# Patient Record
Sex: MALE | Race: WHITE | NOT HISPANIC OR LATINO | URBAN - METROPOLITAN AREA
[De-identification: names, ages, dates, MRNs, and addresses within clinical notes are randomized per-mention and may not be internally consistent; named-entity substitution may affect disease eponyms.]

---

## 2021-05-10 ENCOUNTER — IMMUNIZATION (OUTPATIENT)
Dept: FAMILY MEDICINE | Facility: CLINIC | Age: 21
End: 2021-05-10
Payer: COMMERCIAL

## 2021-05-10 ENCOUNTER — HOSPITAL ENCOUNTER (OUTPATIENT)
Dept: BEHAVIORAL HEALTH | Facility: CLINIC | Age: 21
End: 2021-05-10
Attending: FAMILY MEDICINE
Payer: COMMERCIAL

## 2021-05-10 PROCEDURE — 91301 PR COVID VAC MODERNA 100 MCG/0.5 ML IM: CPT

## 2021-05-10 PROCEDURE — 0011A PR COVID VAC MODERNA 100 MCG/0.5 ML IM: CPT

## 2021-05-10 PROCEDURE — 999N000216 HC STATISTIC ADULT CD FACE TO FACE-NO CHRG: Mod: GT | Performed by: SOCIAL WORKER

## 2021-05-10 NOTE — PROGRESS NOTES
[facility]    ADULT Mental Health Assessment Appointment Note    PATIENT'S NAME:  Leonardo Choi    Preferred Pronoun: Leonardo  MRN: [id number]  YOB: 2000  Address:  [address] [address]  PREFERRED PHONE: [phone]  May we leave a referral related message: Yes    DATE OF SERVICE: 5/10/21  START TIME: 1:30PM  END TIME: 2:28PM  SERVICE MODALITY:  Video Visit:      Provider verified identity through the following two step process.  Patient provided:  Patient     Telemedicine Visit: The patient's condition can be safely assessed and treated via synchronous audio and visual telemedicine encounter.      Reason for Telemedicine Visit: Patient has requested telehealth visit    Originating Site (Patient Location): Patient's home    Distant Site (Provider Location): Provider Remote Setting    Consent:  The patient/guardian has verbally consented to: the potential risks and benefits of telemedicine (video visit) versus in person care; bill my insurance or make self-payment for services provided; and responsibility for payment of non-covered services.     Patient would like the video invitation sent by:  Send to e-mail at: [email or web address]    Mode of Communication:  Video Conference via Amwell    As the provider I attest to compliance with applicable laws and regulations related to telemedicine.    Identifying Information:  Patient is a 21 year old, .    Patient was referred for an assessment by self.    Patient attended the session alone.   Patient identified their preferred language to be English.   Patient reported they does not need the assistance of an  or other support involved in therapy.     Services Requested:   The reason for seeking services at this time is: feels he has been depressed for the past year is a senior at [employer].   -anxious in social situations and that causes him to avoid social  situations.  -denied suicidal thinking.  -lacks support.   -school ended and he is back in [place].   -        Patient has note attempted to resolve these concerns in the past.  Patient does not have legal concerns.    Mental Health:  Review of Symptoms per patient report:  Depression: Change in sleep, Lack of interest, Change in energy level, Difficulties concentrating, Feelings of hopelessness, Feelings of helplessness, Irritability, Feeling sad, down, or depressed and Withdrawn  Staci: No Symptoms  Psychosis: No Symptoms  Anxiety: Excessive worry, Nervousness, Physical complaints, such as headaches, stomachaches, muscle tension, Social anxiety, Sleep disturbance, Psychomotor agitation, Ruminations, Poor concentration and Irritability  Panic: No symptoms  Post Traumatic Stress Disorder: No Symptoms  Eating Disorder: No Symptoms  ADD / ADHD: No symptoms  Conduct Disorder: No symptoms  Autism Spectrum Disorder: No symptoms  Obsessive Compulsive Disorder: No Symptoms    Substance Use:  Do you  have a history of alcohol or illicit drug use? Very occasional use.       Significant Losses / Trauma / Abuse / Neglect Issues:   Patient did not serve in the .  There are indications or report of significant loss, trauma, abuse or neglect issues related to:  None identified.  Concerns for possible neglect are not present.     Medical History:  Patient reports no current medical concerns.    Patient denies any issues with pain..   There are not significant appetite / nutritional concerns / weight changes.     Patient does not report a history of head injury / trauma / cognitive impairment.      Current Mental Status Exam:   Appearance:  Appropriate    Eye Contact:  Fair   Psychomotor:  Normal   Attitude / Demeanor: Guarded   Speech Rate:  Monotone   Volume:  Normal  volume  Language:  intact  Mood:   Anxious  Depressed   Affect:   Constricted    Thought Content: Clear   Thought Process: Coherent     Associations:  No  loosening of associations  Insight:   Fair   Judgment:  Intact   Orientation:  All  Attention:  Fair    Safety Assessment:  Client denied any suicidal ideation or intent to harm self or others. He denied any past suicide attempts and denied any imminent plans to harm himself.  He is living with his parents and help seeking.  Current Safety Concerns:  [place] Suicide Severity Rating Scale (Short Version)  [place] Suicide Severity Rating (Short Version) 5/10/2021   Over the past 2 weeks have you felt down, depressed, or hopeless? yes   Over the past 2 weeks have you had thoughts of killing yourself? no   Have you ever attempted to kill yourself? no     Patient denies current homicidal ideation and behaviors.  Patient denies current self-injurious ideation and behaviors.    Patient denied risk behaviors associated with substance use.  Patient denies any high risk behaviors associated with mental health symptoms.  Patient reports the following current concerns for their personal safety: None.  Patient reports there are  firearms in the house. The firearms are secured in a locked space.     Clinical Impressions:  A. Excessive anxiety and worry about a number of events or activities (such as work or school performance).   B. The person finds it difficult to control the worry.  C. Select 3 or more symptoms (required for diagnosis). Only one item is required in children.   - Being easily fatigued.    - Difficulty concentrating or mind going blank.    - Irritability.    - Muscle tension.    - Sleep disturbance (difficulty falling or staying asleep, or restless unsatisfying sleep).   D. The focus of the anxiety and worry is not confined to features of an Axis I disorder.  E. The anxiety, worry, or physical symptoms cause clinically significant distress or impairment in social, occupational, or other important areas of functioning.   F. The disturbance is not due to the direct physiological effects of a substance (e.g., a  drug of abuse, a medication) or a general medical condition (e.g., hyperthyroidism) and does not occur exclusively during a Mood Disorder, a Psychotic Disorder, or a Pervasive Developmental Disorder.  A) Single episode - symptoms have been present during the same 2-week period and represent a change from previous functioning 5 or more symptoms (required for diagnosis)   - Depressed mood. Note: In children and adolescents, can be irritable mood.     - Diminished interest or pleasure in all, or almost all, activities.    - Psychomotor activity agitation.    - Fatigue or loss of energy.    - Diminished ability to think or concentrate, or indecisiveness.   B) The symptoms cause clinically significant distress or impairment in social, occupational, or other important areas of functioning  C) The episode is not attributable to the physiological effects of a substance or to another medical condition  D) The occurence of major depressive episode is not better explained by other thought / psychotic disorders  E) There has never been a manic episode or hypomanic episode    Therapeutic Interventions Provided: supportive active listening and emotional validation    Recommendations/Plan:  Client returned home from [facility]. He reported the past year he has become increasingly depressed and withdrawn with social anxiety. He has been having very low motivation and has not been able to focus or attend to class assignments and thus has failed his classes this semester and must take them over next fall. He reported limited social support. He denied any suicidal ideation or intent to harm self. He is living with his parents in [place] and is help seeking. We discussed both IOP and the PHP program as well as a referral for medication assessment. At this time he would like to start with individual therapy. Writer assisted him to find a therapy appointment through [facility].     Referrals:   Date: Tuesday,  5/11/2021  Time: 2:00 pm - 3:00 pm  Provider: Lamin Cheung MA  Supervised by: Vanda Yusuf MA  LMFT  Location: [facility], [facility] [employer], [address], [place] [address]  Phone: [phone]  Type: Teletherapy  Lamin Cheung MA  [facility], [facility]  [address] [phone] 5/11/2021 2:00 pm Scheduled      Zuri Chirinos [facility],  May 10, 2021  [facility]

## 2021-05-16 ENCOUNTER — HEALTH MAINTENANCE LETTER (OUTPATIENT)
Age: 21
End: 2021-05-16

## 2021-06-07 ENCOUNTER — IMMUNIZATION (OUTPATIENT)
Dept: FAMILY MEDICINE | Facility: CLINIC | Age: 21
End: 2021-06-07
Attending: FAMILY MEDICINE
Payer: COMMERCIAL

## 2021-06-07 PROCEDURE — 0012A PR COVID VAC MODERNA 100 MCG/0.5 ML IM: CPT

## 2021-06-07 PROCEDURE — 91301 PR COVID VAC MODERNA 100 MCG/0.5 ML IM: CPT

## 2021-09-05 ENCOUNTER — HEALTH MAINTENANCE LETTER (OUTPATIENT)
Age: 21
End: 2021-09-05

## 2021-09-22 ENCOUNTER — LAB REQUISITION (OUTPATIENT)
Dept: LAB | Facility: CLINIC | Age: 21
End: 2021-09-22
Payer: COMMERCIAL

## 2021-09-22 DIAGNOSIS — Z03.818 ENCOUNTER FOR OBSERVATION FOR SUSPECTED EXPOSURE TO OTHER BIOLOGICAL AGENTS RULED OUT: ICD-10-CM

## 2021-10-05 ENCOUNTER — LAB REQUISITION (OUTPATIENT)
Dept: LAB | Facility: CLINIC | Age: 21
End: 2021-10-05
Payer: COMMERCIAL

## 2021-10-05 DIAGNOSIS — Z03.818 ENCOUNTER FOR OBSERVATION FOR SUSPECTED EXPOSURE TO OTHER BIOLOGICAL AGENTS RULED OUT: ICD-10-CM

## 2021-10-08 ENCOUNTER — LAB REQUISITION (OUTPATIENT)
Dept: LAB | Facility: CLINIC | Age: 21
End: 2021-10-08
Payer: COMMERCIAL

## 2021-10-08 DIAGNOSIS — Z03.818 ENCOUNTER FOR OBSERVATION FOR SUSPECTED EXPOSURE TO OTHER BIOLOGICAL AGENTS RULED OUT: ICD-10-CM

## 2021-11-22 ENCOUNTER — LAB REQUISITION (OUTPATIENT)
Dept: LAB | Facility: CLINIC | Age: 21
End: 2021-11-22
Payer: COMMERCIAL

## 2021-11-22 DIAGNOSIS — Z03.818 ENCOUNTER FOR OBSERVATION FOR SUSPECTED EXPOSURE TO OTHER BIOLOGICAL AGENTS RULED OUT: ICD-10-CM

## 2021-11-29 ENCOUNTER — LAB REQUISITION (OUTPATIENT)
Dept: LAB | Facility: CLINIC | Age: 21
End: 2021-11-29
Payer: COMMERCIAL

## 2021-11-29 DIAGNOSIS — Z03.818 ENCOUNTER FOR OBSERVATION FOR SUSPECTED EXPOSURE TO OTHER BIOLOGICAL AGENTS RULED OUT: ICD-10-CM

## 2022-06-12 ENCOUNTER — HEALTH MAINTENANCE LETTER (OUTPATIENT)
Age: 22
End: 2022-06-12

## 2022-07-07 ENCOUNTER — LAB REQUISITION (OUTPATIENT)
Dept: LAB | Facility: CLINIC | Age: 22
End: 2022-07-07
Payer: COMMERCIAL

## 2022-07-07 DIAGNOSIS — Z03.818 ENCOUNTER FOR OBSERVATION FOR SUSPECTED EXPOSURE TO OTHER BIOLOGICAL AGENTS RULED OUT: ICD-10-CM

## 2022-07-07 PROCEDURE — U0005 INFEC AGEN DETEC AMPLI PROBE: HCPCS | Mod: ORL | Performed by: FAMILY MEDICINE

## 2022-07-08 LAB — SARS-COV-2 RNA RESP QL NAA+PROBE: NEGATIVE

## 2022-07-13 ENCOUNTER — LAB REQUISITION (OUTPATIENT)
Dept: LAB | Facility: CLINIC | Age: 22
End: 2022-07-13
Payer: COMMERCIAL

## 2022-07-13 DIAGNOSIS — Z03.818 ENCOUNTER FOR OBSERVATION FOR SUSPECTED EXPOSURE TO OTHER BIOLOGICAL AGENTS RULED OUT: ICD-10-CM

## 2022-07-13 PROCEDURE — U0003 INFECTIOUS AGENT DETECTION BY NUCLEIC ACID (DNA OR RNA); SEVERE ACUTE RESPIRATORY SYNDROME CORONAVIRUS 2 (SARS-COV-2) (CORONAVIRUS DISEASE [COVID-19]), AMPLIFIED PROBE TECHNIQUE, MAKING USE OF HIGH THROUGHPUT TECHNOLOGIES AS DESCRIBED BY CMS-2020-01-R: HCPCS | Mod: ORL | Performed by: FAMILY MEDICINE

## 2022-07-14 LAB — SARS-COV-2 RNA RESP QL NAA+PROBE: NEGATIVE

## 2022-07-27 PROCEDURE — U0003 INFECTIOUS AGENT DETECTION BY NUCLEIC ACID (DNA OR RNA); SEVERE ACUTE RESPIRATORY SYNDROME CORONAVIRUS 2 (SARS-COV-2) (CORONAVIRUS DISEASE [COVID-19]), AMPLIFIED PROBE TECHNIQUE, MAKING USE OF HIGH THROUGHPUT TECHNOLOGIES AS DESCRIBED BY CMS-2020-01-R: HCPCS | Mod: ORL | Performed by: FAMILY MEDICINE

## 2022-07-28 ENCOUNTER — LAB REQUISITION (OUTPATIENT)
Dept: LAB | Facility: CLINIC | Age: 22
End: 2022-07-28
Payer: COMMERCIAL

## 2022-07-28 DIAGNOSIS — U07.1 COVID-19: ICD-10-CM

## 2022-07-28 LAB — SARS-COV-2 RNA RESP QL NAA+PROBE: NEGATIVE

## 2022-08-01 ENCOUNTER — LAB REQUISITION (OUTPATIENT)
Dept: LAB | Facility: CLINIC | Age: 22
End: 2022-08-01
Payer: COMMERCIAL

## 2022-08-01 DIAGNOSIS — U07.1 COVID-19: ICD-10-CM

## 2022-08-01 DIAGNOSIS — Z03.818 ENCOUNTER FOR OBSERVATION FOR SUSPECTED EXPOSURE TO OTHER BIOLOGICAL AGENTS RULED OUT: ICD-10-CM

## 2022-08-01 PROCEDURE — U0003 INFECTIOUS AGENT DETECTION BY NUCLEIC ACID (DNA OR RNA); SEVERE ACUTE RESPIRATORY SYNDROME CORONAVIRUS 2 (SARS-COV-2) (CORONAVIRUS DISEASE [COVID-19]), AMPLIFIED PROBE TECHNIQUE, MAKING USE OF HIGH THROUGHPUT TECHNOLOGIES AS DESCRIBED BY CMS-2020-01-R: HCPCS | Mod: ORL | Performed by: FAMILY MEDICINE

## 2022-08-02 LAB — SARS-COV-2 RNA RESP QL NAA+PROBE: NEGATIVE

## 2022-08-04 PROCEDURE — U0003 INFECTIOUS AGENT DETECTION BY NUCLEIC ACID (DNA OR RNA); SEVERE ACUTE RESPIRATORY SYNDROME CORONAVIRUS 2 (SARS-COV-2) (CORONAVIRUS DISEASE [COVID-19]), AMPLIFIED PROBE TECHNIQUE, MAKING USE OF HIGH THROUGHPUT TECHNOLOGIES AS DESCRIBED BY CMS-2020-01-R: HCPCS | Mod: ORL | Performed by: FAMILY MEDICINE

## 2022-08-05 ENCOUNTER — LAB REQUISITION (OUTPATIENT)
Dept: LAB | Facility: CLINIC | Age: 22
End: 2022-08-05
Payer: COMMERCIAL

## 2022-08-05 DIAGNOSIS — Z03.818 ENCOUNTER FOR OBSERVATION FOR SUSPECTED EXPOSURE TO OTHER BIOLOGICAL AGENTS RULED OUT: ICD-10-CM

## 2022-08-05 LAB — SARS-COV-2 RNA RESP QL NAA+PROBE: NEGATIVE

## 2022-08-10 ENCOUNTER — LAB REQUISITION (OUTPATIENT)
Dept: LAB | Facility: CLINIC | Age: 22
End: 2022-08-10
Payer: COMMERCIAL

## 2022-08-10 DIAGNOSIS — Z03.818 ENCOUNTER FOR OBSERVATION FOR SUSPECTED EXPOSURE TO OTHER BIOLOGICAL AGENTS RULED OUT: ICD-10-CM

## 2022-12-15 ENCOUNTER — OFFICE VISIT (OUTPATIENT)
Dept: FAMILY MEDICINE | Facility: CLINIC | Age: 22
End: 2022-12-15
Payer: COMMERCIAL

## 2022-12-15 VITALS
HEIGHT: 62 IN | TEMPERATURE: 98.6 F | OXYGEN SATURATION: 97 % | DIASTOLIC BLOOD PRESSURE: 78 MMHG | HEART RATE: 105 BPM | SYSTOLIC BLOOD PRESSURE: 148 MMHG | BODY MASS INDEX: 39.14 KG/M2 | WEIGHT: 212.7 LBS | RESPIRATION RATE: 14 BRPM

## 2022-12-15 DIAGNOSIS — Z11.59 NEED FOR HEPATITIS C SCREENING TEST: ICD-10-CM

## 2022-12-15 DIAGNOSIS — Z23 NEED FOR VACCINATION: Primary | ICD-10-CM

## 2022-12-15 DIAGNOSIS — Z11.4 SCREENING FOR HIV (HUMAN IMMUNODEFICIENCY VIRUS): ICD-10-CM

## 2022-12-15 PROCEDURE — 90651 9VHPV VACCINE 2/3 DOSE IM: CPT | Performed by: STUDENT IN AN ORGANIZED HEALTH CARE EDUCATION/TRAINING PROGRAM

## 2022-12-15 PROCEDURE — 36415 COLL VENOUS BLD VENIPUNCTURE: CPT | Performed by: STUDENT IN AN ORGANIZED HEALTH CARE EDUCATION/TRAINING PROGRAM

## 2022-12-15 PROCEDURE — 90472 IMMUNIZATION ADMIN EACH ADD: CPT | Performed by: STUDENT IN AN ORGANIZED HEALTH CARE EDUCATION/TRAINING PROGRAM

## 2022-12-15 PROCEDURE — 90714 TD VACC NO PRESV 7 YRS+ IM: CPT | Performed by: STUDENT IN AN ORGANIZED HEALTH CARE EDUCATION/TRAINING PROGRAM

## 2022-12-15 PROCEDURE — 87389 HIV-1 AG W/HIV-1&-2 AB AG IA: CPT | Performed by: STUDENT IN AN ORGANIZED HEALTH CARE EDUCATION/TRAINING PROGRAM

## 2022-12-15 PROCEDURE — 90471 IMMUNIZATION ADMIN: CPT | Performed by: STUDENT IN AN ORGANIZED HEALTH CARE EDUCATION/TRAINING PROGRAM

## 2022-12-15 PROCEDURE — 99385 PREV VISIT NEW AGE 18-39: CPT | Mod: 25 | Performed by: STUDENT IN AN ORGANIZED HEALTH CARE EDUCATION/TRAINING PROGRAM

## 2022-12-15 PROCEDURE — 86803 HEPATITIS C AB TEST: CPT | Performed by: STUDENT IN AN ORGANIZED HEALTH CARE EDUCATION/TRAINING PROGRAM

## 2022-12-15 RX ORDER — ESTRADIOL 2 MG/1
4 TABLET ORAL DAILY
COMMUNITY
End: 2023-10-13

## 2022-12-15 RX ORDER — LORATADINE 10 MG/1
10 TABLET ORAL DAILY
COMMUNITY
End: 2023-10-13

## 2022-12-15 RX ORDER — SPIRONOLACTONE 100 MG/1
100 TABLET, FILM COATED ORAL DAILY
COMMUNITY
End: 2023-10-13

## 2022-12-15 ASSESSMENT — ENCOUNTER SYMPTOMS
MYALGIAS: 0
FEVER: 0
HEARTBURN: 0
HEADACHES: 0
COUGH: 0
DIZZINESS: 0
SORE THROAT: 0
JOINT SWELLING: 0
FREQUENCY: 0
CONSTIPATION: 0
SHORTNESS OF BREATH: 0
DYSURIA: 0
CHILLS: 0
HEMATURIA: 0
NAUSEA: 0
ABDOMINAL PAIN: 0
WEAKNESS: 0
DIARRHEA: 0
HEMATOCHEZIA: 0
PALPITATIONS: 0
NERVOUS/ANXIOUS: 1
EYE PAIN: 0
ARTHRALGIAS: 0
PARESTHESIAS: 0

## 2022-12-15 ASSESSMENT — PAIN SCALES - GENERAL: PAINLEVEL: NO PAIN (0)

## 2022-12-15 NOTE — LETTER
December 16, 2022      Leonardo Choi  [address] [address] [address]        Dear ,    We are writing to inform you of your test results.    It was a pleasure speaking with you. I have received and reviewed your lab results, and have the following recommendations:      Congrats! You tested negative for HIV and Hep C.      Resulted Orders   Hepatitis C Screen Reflex to HCV RNA Quant and Genotype   Result Value Ref Range    Hepatitis C Antibody Nonreactive Nonreactive    Narrative    Assay performance characteristics have not been established for newborns, infants, and children.   HIV Antigen Antibody Combo   Result Value Ref Range    HIV Antigen Antibody Combo Nonreactive Nonreactive      Comment:      HIV-1 p24 Ag & HIV-1/HIV-2 Ab Not Detected       If you have any questions or concerns, please call the clinic at the number listed above.       Sincerely,      Cee Kruse MD

## 2022-12-15 NOTE — NURSING NOTE
Immunizations Administered     Name Date Dose VIS Date Route    HPV9 12/15/22  1:29 PM 0.5 mL 08/06/2021, Given Today Intramuscular    TD (ADULT, 7+) 12/15/22  1:29 PM 0.5 mL 08/06/2021, Given Today Intramuscular        .After obtaining informed consent, the HPV an TD immunization is given, one in each deltoid. Patient verified all immunization questions prior to vaccination. Patient is remaining in clinic for 15 minutes to monitor for adverse reactions.    MEMO Owens LPN on 12/15/2022 at 1:33 PM

## 2022-12-15 NOTE — PATIENT INSTRUCTIONS
Hello! It was a pleasure seeing you today. Just some things we discussed at today's visit:     Weight loss    Try to limit your dietary intake of carbohydrate rich foods like rice, pasta, breads, potatoes, sweets. Additionally, limit your intake of drinks with added sugars, such as juice, regular soda, and regular sports or energy drinks.  It is ok for you to have lean meats, chicken, turkey, fish, eggs, nuts, beans, lentils, and tofu.   With regards to exercise, try to get at least 30 minutes of CONTINUOUS aerobic exercise at least 3 times per week.     GAHT   You are always welcome in my clinic for GAHT, however, if you feel more comfortable going to [facility], then please make sure you follow up for routine lab work       Sincerely,     Cee Kruse MD

## 2022-12-15 NOTE — PROGRESS NOTES
"SUBJECTIVE:   CC: Leonardo is an 22 year old who presents for preventative health visit.     Patient has been advised of split billing requirements and indicates understanding: Yes  Healthy Habits:     Getting at least 3 servings of Calcium per day:  Yes    Bi-annual eye exam:  Yes    Dental care twice a year:  Yes    Sleep apnea or symptoms of sleep apnea:  None    Diet:  Regular (no restrictions)    Frequency of exercise:  2-3 days/week    Duration of exercise:  15-30 minutes    Taking medications regularly:  Yes    Barriers to taking medications:  None    Medication side effects:  None    PHQ-2 Total Score: 0    Additional concerns today:  No    Loratadine PRN for allergies    Has been on GAHT for 4.5 months when asked about pronouns responded \"anything is fine\"  Estrace and Spironolactone   Admits dysphoria has been \"not that great\"    Patient appeared visibly uncomfortable speaking with provider about GAHT, provider asked patient if they would be ok discussing the matter further, or if the patient would like to discuss their GAHT with their [facility] provider, patient preferred to speak about GAHT with their provider at [facility]     Social History:   Lives at home with parents in [place]   Got degree in chemical engineering, currently looking for employment   Denies any cigarette smoking admits they vaped 3 years ago but infrequently   Drinks only in social situations  Denies recreational drug use   Likes to read for fun likes mysteries       Family History:   Paternal grandmother with lung cancer had a smoking history but quit for at least 20 years     Allergies:   Was told they had an allergic reaction to amoxicillin as a child, but unsure what the reaction was, documented \"blood in stool\"     Surgeries:   None     Sexual History:   Never been sexually active     Today's PHQ-2 Score:   PHQ-2 ( 2000 Pfizer) 12/15/2022   Q1: Little interest or pleasure in doing things 0   Q2: Feeling down, " depressed or hopeless 0   PHQ-2 Score 0   Q1: Little interest or pleasure in doing things Not at all   Q2: Feeling down, depressed or hopeless Not at all   PHQ-2 Score 0       Have you ever done Advance Care Planning? (For example, a Health Directive, POLST, or a discussion with a medical provider or your loved ones about your wishes): No, advance care planning information given to patient to review.  Advanced care planning was discussed at today's visit.    Social History     Tobacco Use     Smoking status: Never     Smokeless tobacco: Not on file     Tobacco comments:     No exposure.   Substance Use Topics     Alcohol use: No     If you drink alcohol do you typically have >3 drinks per day or >7 drinks per week? No    Alcohol Use 12/15/2022   Prescreen: >3 drinks/day or >7 drinks/week? No   Prescreen: >3 drinks/day or >7 drinks/week? -       Last PSA: No results found for: PSA    Reviewed orders with patient. Reviewed health maintenance and updated orders accordingly - Yes    Reviewed and updated as needed this visit by clinical staff   Tobacco  Allergies  Meds              Reviewed and updated as needed this visit by Provider                 Review of Systems   Constitutional: Negative for chills and fever.   HENT: Negative for congestion, ear pain, hearing loss and sore throat.    Eyes: Negative for pain and visual disturbance.   Respiratory: Negative for cough and shortness of breath.    Cardiovascular: Negative for chest pain, palpitations and peripheral edema.   Gastrointestinal: Negative for abdominal pain, constipation, diarrhea, heartburn, hematochezia and nausea.   Genitourinary: Positive for impotence. Negative for dysuria, frequency, genital sores, hematuria, penile discharge and urgency.   Musculoskeletal: Negative for arthralgias, joint swelling and myalgias.   Skin: Negative for rash.   Neurological: Negative for dizziness, weakness, headaches and paresthesias.   Psychiatric/Behavioral: Negative  "for mood changes. The patient is nervous/anxious.      OBJECTIVE:   BP (!) 148/78 (BP Location: Right arm, Patient Position: Sitting, Cuff Size: Adult Regular)   Pulse 105   Temp 98.6  F (37  C) (Tympanic)   Resp 14   Ht 1.57 m (5' 1.81\")   Wt 96.5 kg (212 lb 11.2 oz)   SpO2 97%   BMI 39.14 kg/m      Physical Exam  Constitutional:       General: He is not in acute distress.  HENT:      Head: Normocephalic and atraumatic.      Right Ear: External ear normal.      Left Ear: External ear normal.      Mouth/Throat:      Mouth: Mucous membranes are moist.      Pharynx: Oropharynx is clear. No oropharyngeal exudate or posterior oropharyngeal erythema.   Eyes:      Extraocular Movements: Extraocular movements intact.   Cardiovascular:      Rate and Rhythm: Normal rate and regular rhythm.      Heart sounds: Normal heart sounds.   Pulmonary:      Effort: Pulmonary effort is normal. No respiratory distress.      Breath sounds: Normal breath sounds. No wheezing or rhonchi.   Abdominal:      Palpations: Abdomen is soft. There is no mass.      Tenderness: There is no abdominal tenderness.   Musculoskeletal:         General: No deformity. Normal range of motion.      Cervical back: Normal range of motion and neck supple.   Skin:     General: Skin is warm.      Findings: No rash.   Neurological:      General: No focal deficit present.      Mental Status: He is alert and oriented to person, place, and time.   Psychiatric:      Comments: Would not look provider in the eye, slow to answer questions, anxious appearing        ASSESSMENT/PLAN:   1. Screening for HIV (human immunodeficiency virus)  - HIV Antigen Antibody Combo    2. Need for hepatitis C screening test  - Hepatitis C Screen Reflex to HCV RNA Quant and Genotype    3. Need for vaccination  - the following medications were administered in clinic today   - Human Papilloma Virus Vaccine (Gardasil 9) 3 Dose IM  - TD (ADULT, 7+) PRESERVE FREE    Cee Kruse MD "     Patient has been advised of split billing requirements and indicates understanding: Yes      COUNSELING:   Reviewed preventive health counseling, as reflected in patient instructions       Regular exercise       Healthy diet/nutrition        He reports that he has never smoked. He does not have any smokeless tobacco history on file.            MARCIN CULP MD  [facility]

## 2022-12-16 LAB
HCV AB SERPL QL IA: NONREACTIVE
HIV 1+2 AB+HIV1 P24 AG SERPL QL IA: NONREACTIVE

## 2022-12-16 NOTE — RESULT ENCOUNTER NOTE
Dennise Choi,     It was a pleasure speaking with you. I have received and reviewed your lab results, and have the following recommendations:     Congrats! You tested negative for HIV and Hep C.        Sincerely,     Cee Kruse MD

## 2023-07-21 ENCOUNTER — OFFICE VISIT (OUTPATIENT)
Dept: FAMILY MEDICINE | Facility: CLINIC | Age: 23
End: 2023-07-21
Payer: COMMERCIAL

## 2023-07-21 VITALS
TEMPERATURE: 98.1 F | HEIGHT: 65 IN | HEART RATE: 68 BPM | WEIGHT: 206.8 LBS | RESPIRATION RATE: 18 BRPM | DIASTOLIC BLOOD PRESSURE: 92 MMHG | OXYGEN SATURATION: 98 % | SYSTOLIC BLOOD PRESSURE: 122 MMHG | BODY MASS INDEX: 34.45 KG/M2

## 2023-07-21 DIAGNOSIS — F64.0 GENDER DYSPHORIA IN ADULT: Primary | ICD-10-CM

## 2023-07-21 LAB
ANION GAP SERPL CALCULATED.3IONS-SCNC: 9 MMOL/L (ref 7–15)
BUN SERPL-MCNC: 8.3 MG/DL (ref 6–20)
CALCIUM SERPL-MCNC: 9.7 MG/DL (ref 8.6–10)
CHLORIDE SERPL-SCNC: 105 MMOL/L (ref 98–107)
CREAT SERPL-MCNC: 0.8 MG/DL (ref 0.67–1.17)
DEPRECATED HCO3 PLAS-SCNC: 27 MMOL/L (ref 22–29)
ESTRADIOL SERPL-MCNC: 422 PG/ML
GFR SERPL CREATININE-BSD FRML MDRD: >90 ML/MIN/1.73M2
GLUCOSE SERPL-MCNC: 98 MG/DL (ref 70–99)
POTASSIUM SERPL-SCNC: 3.9 MMOL/L (ref 3.4–5.3)
SODIUM SERPL-SCNC: 141 MMOL/L (ref 136–145)

## 2023-07-21 PROCEDURE — 82670 ASSAY OF TOTAL ESTRADIOL: CPT | Performed by: STUDENT IN AN ORGANIZED HEALTH CARE EDUCATION/TRAINING PROGRAM

## 2023-07-21 PROCEDURE — 99213 OFFICE O/P EST LOW 20 MIN: CPT | Performed by: STUDENT IN AN ORGANIZED HEALTH CARE EDUCATION/TRAINING PROGRAM

## 2023-07-21 PROCEDURE — 36415 COLL VENOUS BLD VENIPUNCTURE: CPT | Performed by: STUDENT IN AN ORGANIZED HEALTH CARE EDUCATION/TRAINING PROGRAM

## 2023-07-21 PROCEDURE — 84403 ASSAY OF TOTAL TESTOSTERONE: CPT | Performed by: STUDENT IN AN ORGANIZED HEALTH CARE EDUCATION/TRAINING PROGRAM

## 2023-07-21 PROCEDURE — 80048 BASIC METABOLIC PNL TOTAL CA: CPT | Performed by: STUDENT IN AN ORGANIZED HEALTH CARE EDUCATION/TRAINING PROGRAM

## 2023-07-21 ASSESSMENT — PAIN SCALES - GENERAL: PAINLEVEL: NO PAIN (0)

## 2023-07-21 NOTE — PROGRESS NOTES
"  1. Gender dysphoria in adult  > Patient has been on estrogen for roughly 1 year now, stop taking Aldactone 3 months ago due to increased urinary frequency  > Estradiol levels from 3 months ago was reportedly 177, and Testosterone was 14  - Estradiol  - Testosterone, total  - Basic metabolic panel  (Ca, Cl, CO2, Creat, Gluc, K, Na, BUN)  - Encouraged use of psychology today to find a therapist given that they are not out to friends or family members  - Reported that they would be interested in starting GnRH therapy, however, I did inform patient that the benefit of this medication in a patient who has already gone through puberty may not necessarily be of much use, recommended that we could try finasteride instead, patient reported that like to think about it first    Follow up plan:   - have patient return to clinic in 3 months pending lab results     Ravin Patterson is a 23 year old, presenting for the following health issues:  transgender care        7/21/2023     8:27 AM   Additional Questions   Roomed by Carina HUNTER LPN   Accompanied by self         7/21/2023     8:27 AM   Patient Reported Additional Medications   Patient reports taking the following new medications Fluticasone PRN     HPI   Chief Complaint   Patient presents with    transgender care     And lab work     Has been experiencing dysphoria since they were 13 years old   Has been on Estrogen for about 1 year now, started aldactone 6 months or so ago, stopped taking it 3 months ago and now wants to see if they don't have to take it   Never gone to therapy, admits they are not out to friends or family   Hasn't expressed gender identity publicly   Never been sexually active   States dysphoria is still about the same as it was in Dec \"not that great\"   Estrogen from 3 months ago was around 177, and Testosterone was 14   Is looking forward to fat redistribution, is aware voice changes can be hard, is interested in FFS and laser treatment for facial " "hair   Admits does not like the side effects off increased urination with spironolactone and is interested in GnRH analogues, is unsure how they feel about being placed on Finasteride instead     Review of Systems   As above       Objective    BP (!) 158/92 (BP Location: Right arm, Patient Position: Sitting, Cuff Size: Adult Large)   Pulse 68   Temp 98.1  F (36.7  C) (Tympanic)   Resp 18   Ht 1.661 m (5' 5.39\")   Wt 93.8 kg (206 lb 12.8 oz)   SpO2 98%   BMI 34.00 kg/m    Body mass index is 34 kg/m .  Physical Exam  Constitutional:       General: He is not in acute distress.     Appearance: Normal appearance.   HENT:      Head: Normocephalic.   Eyes:      General: No scleral icterus.        Right eye: No discharge.         Left eye: No discharge.      Extraocular Movements: Extraocular movements intact.      Conjunctiva/sclera: Conjunctivae normal.   Pulmonary:      Effort: Pulmonary effort is normal. No respiratory distress.   Musculoskeletal:         General: Normal range of motion.      Cervical back: Normal range of motion.   Skin:     General: Skin is warm.      Findings: No rash.      Comments: No rash on exposed skin   Neurological:      General: No focal deficit present.      Mental Status: He is alert and oriented to person, place, and time.   Psychiatric:         Mood and Affect: Mood normal.         Behavior: Behavior normal.                         "

## 2023-07-22 NOTE — RESULT ENCOUNTER NOTE
Hello!     It is a pleasure providing you with medical care. I have received and reviewed your lab results, and have the following recommendations:     It appears that you have an elevated estrogen level. Ideally we would want your estrogen ranges to be between 100-200pg/mL and your value was 422pg/mL. I know when you saw me in clinic you had mentioned you were taking injection based estrogen. Could you please let me know the name of the injection estrogen you are taking, the dose, and the frequency of the medication? We will likely have to make adjustments to your current regimen.     Your testosterone levels are still pending.     Sincerely,     Cee Kruse MD

## 2023-07-24 NOTE — PROGRESS NOTES
Patient reports that she is giving herself 5 mg of IM estrogen valerate every 5 days.  Given her estrogen levels are greater than 200, recommended she decrease her frequency from every 5 days to every 7 days.  We can continue to closely monitor her estrogen levels in 3 months time with these changes.     Cee Kruse MD

## 2023-07-25 LAB — TESTOST SERPL-MCNC: 19 NG/DL (ref 240–950)

## 2023-07-25 NOTE — RESULT ENCOUNTER NOTE
Dennise Choi,     It is a pleasure providing you with medical care. I have received and reviewed your lab results, and have the following recommendations:     Good news, your Testosterone levels are <55. Please let me know if you have any questions regarding the change in your estrogen injection frequency.       Sincerely,     Cee Kruse MD

## 2023-10-13 ENCOUNTER — OFFICE VISIT (OUTPATIENT)
Dept: FAMILY MEDICINE | Facility: CLINIC | Age: 23
End: 2023-10-13
Payer: COMMERCIAL

## 2023-10-13 VITALS
BODY MASS INDEX: 34.15 KG/M2 | WEIGHT: 200 LBS | RESPIRATION RATE: 18 BRPM | HEIGHT: 64 IN | SYSTOLIC BLOOD PRESSURE: 138 MMHG | HEART RATE: 77 BPM | OXYGEN SATURATION: 98 % | DIASTOLIC BLOOD PRESSURE: 80 MMHG | TEMPERATURE: 98.9 F

## 2023-10-13 DIAGNOSIS — F64.0 GENDER DYSPHORIA IN ADULT: Primary | ICD-10-CM

## 2023-10-13 DIAGNOSIS — Z23 IMMUNIZATION DUE: ICD-10-CM

## 2023-10-13 LAB
ANION GAP SERPL CALCULATED.3IONS-SCNC: 10 MMOL/L (ref 7–15)
BUN SERPL-MCNC: 13.5 MG/DL (ref 6–20)
CALCIUM SERPL-MCNC: 9.5 MG/DL (ref 8.6–10)
CHLORIDE SERPL-SCNC: 102 MMOL/L (ref 98–107)
CREAT SERPL-MCNC: 0.74 MG/DL (ref 0.67–1.17)
DEPRECATED HCO3 PLAS-SCNC: 27 MMOL/L (ref 22–29)
EGFRCR SERPLBLD CKD-EPI 2021: >90 ML/MIN/1.73M2
ESTRADIOL SERPL-MCNC: 95 PG/ML
GLUCOSE SERPL-MCNC: 96 MG/DL (ref 70–99)
POTASSIUM SERPL-SCNC: 3.7 MMOL/L (ref 3.4–5.3)
SODIUM SERPL-SCNC: 139 MMOL/L (ref 135–145)

## 2023-10-13 PROCEDURE — 99213 OFFICE O/P EST LOW 20 MIN: CPT | Mod: 25 | Performed by: STUDENT IN AN ORGANIZED HEALTH CARE EDUCATION/TRAINING PROGRAM

## 2023-10-13 PROCEDURE — 91320 SARSCV2 VAC 30MCG TRS-SUC IM: CPT | Performed by: STUDENT IN AN ORGANIZED HEALTH CARE EDUCATION/TRAINING PROGRAM

## 2023-10-13 PROCEDURE — 90471 IMMUNIZATION ADMIN: CPT | Performed by: STUDENT IN AN ORGANIZED HEALTH CARE EDUCATION/TRAINING PROGRAM

## 2023-10-13 PROCEDURE — 80048 BASIC METABOLIC PNL TOTAL CA: CPT | Performed by: STUDENT IN AN ORGANIZED HEALTH CARE EDUCATION/TRAINING PROGRAM

## 2023-10-13 PROCEDURE — 82670 ASSAY OF TOTAL ESTRADIOL: CPT | Performed by: STUDENT IN AN ORGANIZED HEALTH CARE EDUCATION/TRAINING PROGRAM

## 2023-10-13 PROCEDURE — 90651 9VHPV VACCINE 2/3 DOSE IM: CPT | Performed by: STUDENT IN AN ORGANIZED HEALTH CARE EDUCATION/TRAINING PROGRAM

## 2023-10-13 PROCEDURE — 36415 COLL VENOUS BLD VENIPUNCTURE: CPT | Performed by: STUDENT IN AN ORGANIZED HEALTH CARE EDUCATION/TRAINING PROGRAM

## 2023-10-13 PROCEDURE — 90472 IMMUNIZATION ADMIN EACH ADD: CPT | Performed by: STUDENT IN AN ORGANIZED HEALTH CARE EDUCATION/TRAINING PROGRAM

## 2023-10-13 PROCEDURE — 84403 ASSAY OF TOTAL TESTOSTERONE: CPT | Performed by: STUDENT IN AN ORGANIZED HEALTH CARE EDUCATION/TRAINING PROGRAM

## 2023-10-13 PROCEDURE — 90686 IIV4 VACC NO PRSV 0.5 ML IM: CPT | Performed by: STUDENT IN AN ORGANIZED HEALTH CARE EDUCATION/TRAINING PROGRAM

## 2023-10-13 PROCEDURE — 90480 ADMN SARSCOV2 VAC 1/ONLY CMP: CPT | Performed by: STUDENT IN AN ORGANIZED HEALTH CARE EDUCATION/TRAINING PROGRAM

## 2023-10-13 ASSESSMENT — PAIN SCALES - GENERAL: PAINLEVEL: NO PAIN (0)

## 2023-10-13 NOTE — PROGRESS NOTES
"  1. Gender dysphoria in adult  > has been taking 5mg Estradiol Valerate [facility], admits to taking that weekly instead of every 5 days given her previously elevated estrogen levels   - Estradiol; Future  - Testosterone, total; Future  - Basic metabolic panel  (Ca, Cl, CO2, Creat, Gluc, K, Na, BUN); Future  - if Testosterone levels are increasing (greater than 55) patient states they are ok restarting Spironolactone     2. Immunization due  > the following vaccines were administered today:   - HPV9 (GARDASIL 9)  - INFLUENZA VACCINE IM > 6 MONTHS VALENT IIV4 (AFLURIA/FLUZONE)  - COVID-19 12+ (2023-24) (PFIZER)        Ravin Patterson is a 23 year old, presenting for the following health issues:  transgender care (Would like HPV, flu and covid-19 booster today) and Recheck Medication        10/13/2023     7:58 AM   Additional Questions   Roomed by Carina HUNTER LPN   Accompanied by self         10/13/2023     7:58 AM   Patient Reported Additional Medications   Patient reports taking the following new medications Estradiol injection 5mg once weekly       HPI     Chief Complaint   Patient presents with    transgender care     Would like HPV, flu and covid-19 booster today    Recheck Medication     Medication Followup of estradiol 2mg   Taking Medication as prescribed: NO-using injection now instead  Side Effects:  None  Medication Helping Symptoms:  yes    Hasn't been taking spirolactone for at least 3 months is interested in trying estrogen by itself   States she is taking 5mg IM estrogen valerate weekly     Review of Systems   As above       Objective    BP (!) 146/82 (BP Location: Right arm, Patient Position: Sitting, Cuff Size: Adult Regular)   Pulse 77   Temp 98.9  F (37.2  C) (Tympanic)   Resp 18   Ht 1.626 m (5' 4\")   Wt 90.7 kg (200 lb)   SpO2 98%   BMI 34.33 kg/m    Body mass index is 34.33 kg/m .  Physical Exam  Constitutional:       General: He is not in " acute distress.     Appearance: Normal appearance.   HENT:      Head: Normocephalic.   Eyes:      General: No scleral icterus.        Right eye: No discharge.         Left eye: No discharge.      Extraocular Movements: Extraocular movements intact.      Conjunctiva/sclera: Conjunctivae normal.   Pulmonary:      Effort: Pulmonary effort is normal. No respiratory distress.   Musculoskeletal:         General: Normal range of motion.      Cervical back: Normal range of motion.   Skin:     General: Skin is warm.      Findings: No rash.      Comments: No rash on exposed skin   Neurological:      General: No focal deficit present.      Mental Status: He is alert and oriented to person, place, and time.   Psychiatric:      Comments: Quiet/reserved

## 2023-10-13 NOTE — NURSING NOTE
Immunizations Administered       Name Date Dose VIS Date Route    COVID-19 12+ (2023-24) (Pfizer) 10/13/23  8:34 AM 0.3 mL EUI,09/12/2023,Given today Intramuscular    HPV9 10/13/23  8:33 AM 0.5 mL 08/06/2021, Given Today Intramuscular    INFLUENZA VACCINE >6 MONTHS (Afluria, Fluzone) 10/13/23  8:33 AM 0.5 mL 08/06/2021, Given Today Intramuscular          Prior to immunization administration, verified patients identity using patient s name and date of birth. Please see Immunization Activity for additional information.     Screening Questionnaire for Adult Immunization    Are you sick today?   No   Do you have allergies to medications, food, a vaccine component or latex?   No   Have you ever had a serious reaction after receiving a vaccination?   No   Do you have a long-term health problem with heart, lung, kidney, or metabolic disease (e.g., diabetes), asthma, a blood disorder, no spleen, complement component deficiency, a cochlear implant, or a spinal fluid leak?  Are you on long-term aspirin therapy?   No   Do you have cancer, leukemia, HIV/AIDS, or any other immune system problem?   No   Do you have a parent, brother, or sister with an immune system problem?   No   In the past 3 months, have you taken medications that affect  your immune system, such as prednisone, other steroids, or anticancer drugs; drugs for the treatment of rheumatoid arthritis, Crohn s disease, or psoriasis; or have you had radiation treatments?   No   Have you had a seizure, or a brain or other nervous system problem?   No   During the past year, have you received a transfusion of blood or blood    products, or been given immune (gamma) globulin or antiviral drug?   No   For women: Are you pregnant or is there a chance you could become       pregnant during the next month?   No   Have you received any vaccinations in the past 4 weeks?   No     Immunization questionnaire answers were all negative.      Patient instructed to remain in clinic  for 15 minutes afterwards, and to report any adverse reactions.     Screening performed by Carina HUNTER LPN on 10/13/2023 at 8:36 AM.

## 2023-10-18 DIAGNOSIS — F64.0 GENDER DYSPHORIA IN ADULT: Primary | ICD-10-CM

## 2023-10-18 LAB — TESTOST SERPL-MCNC: 19 NG/DL (ref 240–950)

## 2023-10-18 NOTE — RESULT ENCOUNTER NOTE
Dennise Choi,     It is a pleasure providing you with medical care. I have received and reviewed your lab results, and have the following recommendations:     Good news, based on your lab results, your testosterone is within appropriate ranges. Your BMP showed your electrolytes are within normal limits. Your Estrogen ideally should be within the 100-200 range, however, your value of 95 is acceptable for you to continue with your current dose of estradiol at this time.    We will recheck these labs again in 3 months to ensure your estrogen levels are staying close to recommended ranges. Please call the lab to schedule an appointment in 3 months to recheck your estrogen levels. Based on those future results we may have to adjust your estrogen medication or keep you on your current dose.       Sincerely,     Cee Kruse MD

## 2023-12-18 DIAGNOSIS — F64.0 GENDER DYSPHORIA IN ADULT: Primary | ICD-10-CM

## 2023-12-18 RX ORDER — ESTRADIOL VALERATE 10 MG/ML
5 INJECTION INTRAMUSCULAR WEEKLY
Qty: 2 ML | Refills: 1 | Status: SHIPPED | OUTPATIENT
Start: 2023-12-18

## 2023-12-26 ENCOUNTER — DOCUMENTATION ONLY (OUTPATIENT)
Dept: FAMILY MEDICINE | Facility: CLINIC | Age: 23
End: 2023-12-26
Payer: COMMERCIAL

## 2023-12-26 NOTE — PROGRESS NOTES
The patient has a lab appointment on 12/28/2023 and the lab orders is expected to be draw on 1/18/2024. Is it okay to draw early? If not please let the patient know. Thank you lab!

## 2023-12-28 ENCOUNTER — LAB (OUTPATIENT)
Dept: LAB | Facility: CLINIC | Age: 23
End: 2023-12-28
Payer: COMMERCIAL

## 2023-12-28 DIAGNOSIS — F64.0 GENDER DYSPHORIA IN ADULT: ICD-10-CM

## 2023-12-28 PROCEDURE — 36415 COLL VENOUS BLD VENIPUNCTURE: CPT

## 2023-12-28 PROCEDURE — 82670 ASSAY OF TOTAL ESTRADIOL: CPT

## 2023-12-28 PROCEDURE — 84403 ASSAY OF TOTAL TESTOSTERONE: CPT

## 2023-12-29 DIAGNOSIS — F64.0 GENDER DYSPHORIA IN ADULT: ICD-10-CM

## 2023-12-29 LAB — ESTRADIOL SERPL-MCNC: 337 PG/ML

## 2023-12-29 NOTE — RESULT ENCOUNTER NOTE
Dennise Choi,     It is a pleasure providing you with medical care. I have received and reviewed your lab results, and have the following recommendations:     Based on the results of your estrogen level, your values are slightly above the recommended limits (100-200pg/mL).  Before we make any changes to your estradiol prescription, I do have some questions/recommendations which are as follows:    By any chance to remember if you have provided lab work within 1 to 3 days of injecting yourself with your estrogen medication?  If so that could lead to this elevated level.    If you had not recently injected yourself with the estradiol, please let me know.  I do recommend to repeat blood work just to ensure that this elevated level is truly elevated and not in the setting of a lab error.  Once I hear back from you I can provide you with additional recommendations as well as placing an order for estradiol labs again if needed.    Sincerely,     Cee Kruse MD

## 2024-01-01 LAB — TESTOST SERPL-MCNC: 16 NG/DL (ref 240–950)

## 2024-01-03 DIAGNOSIS — F64.0 GENDER DYSPHORIA IN ADULT: Primary | ICD-10-CM

## 2024-01-03 NOTE — PROGRESS NOTES
Given elevated estrogen levels, patient is willing to take a biweekly estradiol valerate prescription instead of their weekly dose. Patient will be made aware to schedule a follow-up appointment with lab in the next 3 months to recheck their estrogen levels.     Cee Kruse MD

## 2024-01-03 NOTE — RESULT ENCOUNTER NOTE
Dnenise Choi,     Your testosterone levels are within recommended limits.     Sincerely,     Cee Kruse MD

## 2024-01-20 ENCOUNTER — HEALTH MAINTENANCE LETTER (OUTPATIENT)
Age: 24
End: 2024-01-20

## 2025-01-26 ENCOUNTER — HEALTH MAINTENANCE LETTER (OUTPATIENT)
Age: 25
End: 2025-01-26